# Patient Record
(demographics unavailable — no encounter records)

---

## 2024-12-30 NOTE — ASSESSMENT
[FreeTextEntry1] : (1) HCM - discussed diet, exercise, weight maintenance. Labs drawn in office as below.  HIV testing offered and patient declined.  Patient declines further COVID-19 boosters.  She received the influenza vaccination this season.  Tdap given today.  Patient is a frequent traveler and would need a booster of typhoid (last given 2019) if traveling to a region for which it is recommended. Continue Gyn exams as directed by Dr. Diallo.  Patient was previously given Health Care Proxy information.  (2) GI - follow-up with GI for IBS symptoms.  (3) Derm - patient uses fluocinonide for hand eczema.  She was also given tacrolimus.  She sees Allergy and Dermatology.  Patient also has a family history of melanoma and should have annual full body skin exams by Dermatology.  (4) Heme - patient reports a history of iron deficiency, and iron studies were added to labs today.  She has also had low B12 levels and would recommend vitamin B12 1000mcg daily.  (5) Ortho - patient self-treating wrist pains with splints.  I recommended that she have an evaluation by an Ortho-hand physician.

## 2024-12-30 NOTE — HEALTH RISK ASSESSMENT
[Yes] : Yes [2 - 3 times a week (3 pts)] : 2 - 3  times a week (3 points) [No] : In the past 12 months have you used drugs other than those required for medical reasons? No [No falls in past year] : Patient reported no falls in the past year [0] : 2) Feeling down, depressed, or hopeless: Not at all (0) [PHQ-2 Negative - No further assessment needed] : PHQ-2 Negative - No further assessment needed [Patient reported PAP Smear was normal] : Patient reported PAP Smear was normal [Patient reported colonoscopy was normal] : Patient reported colonoscopy was normal [HIV test declined] : HIV test declined [None] : None [With Family] : lives with family [Employed] : employed [Single] : single [Fully functional (bathing, dressing, toileting, transferring, walking, feeding)] : Fully functional (bathing, dressing, toileting, transferring, walking, feeding) [Fully functional (using the telephone, shopping, preparing meals, housekeeping, doing laundry, using] : Fully functional and needs no help or supervision to perform IADLs (using the telephone, shopping, preparing meals, housekeeping, doing laundry, using transportation, managing medications and managing finances) [Smoke Detector] : smoke detector [Carbon Monoxide Detector] : carbon monoxide detector [Seat Belt] :  uses seat belt [Sunscreen] : uses sunscreen [Designated Healthcare Proxy] : Designated healthcare proxy [Name: ___] : Health Care Proxy's Name: [unfilled]  [Relationship: ___] : Relationship: [unfilled] [Never] : Never [de-identified] : Runner, 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 [[id number]] : 0 [Change in mental status noted] : No change in mental status noted [Sexually Active] : not sexually active [Reports changes in hearing] : Reports no changes in hearing [Reports changes in vision] : Reports no changes in vision [Reports changes in dental health] : Reports no changes in dental health [PapSmearDate] : 2024 [PapSmearComments] : Dr. Valdez Diallo [ColonoscopyDate] : 03/21 [ColonoscopyComments] : Dr. Madi Sewell [FreeTextEntry2] :  History [de-identified] : No vision corrections.  Seen by ophthalmologist 10/31/2022 - allergic reaction involving eyes.  Had cyst in eye. [AdvancecareDate] : 12/30/2024 [FreeTextEntry4] : Written materials for preparing a Health Care Proxy were previously given to patient, and I encouraged the patient to complete a Health Care Proxy.

## 2024-12-30 NOTE — PHYSICAL EXAM
[No Acute Distress] : no acute distress [Well Nourished] : well nourished [Well Developed] : well developed [Well-Appearing] : well-appearing [Normal Voice/Communication] : normal voice/communication [Normal Sclera/Conjunctiva] : normal sclera/conjunctiva [PERRL] : pupils equal round and reactive to light [Normal Outer Ear/Nose] : the outer ears and nose were normal in appearance [Normal Oropharynx] : the oropharynx was normal [Normal TMs] : both tympanic membranes were normal [No JVD] : no jugular venous distention [No Lymphadenopathy] : no lymphadenopathy [Supple] : supple [Thyroid Normal, No Nodules] : the thyroid was normal and there were no nodules present [No Respiratory Distress] : no respiratory distress  [No Accessory Muscle Use] : no accessory muscle use [Clear to Auscultation] : lungs were clear to auscultation bilaterally [Normal Rate] : normal rate  [Regular Rhythm] : with a regular rhythm [Normal S1, S2] : normal S1 and S2 [No Murmur] : no murmur heard [No Carotid Bruits] : no carotid bruits [No Abdominal Bruit] : a ~M bruit was not heard ~T in the abdomen [No Varicosities] : no varicosities [Pedal Pulses Present] : the pedal pulses are present [No Edema] : there was no peripheral edema [No Extremity Clubbing/Cyanosis] : no extremity clubbing/cyanosis [Normal Appearance] : normal in appearance [No Nipple Discharge] : no nipple discharge [No Axillary Lymphadenopathy] : no axillary lymphadenopathy [Soft] : abdomen soft [Non Tender] : non-tender [Non-distended] : non-distended [No Masses] : no abdominal mass palpated [No HSM] : no HSM [Normal Bowel Sounds] : normal bowel sounds [No Hernias] : no hernias [No CVA Tenderness] : no CVA  tenderness [No Spinal Tenderness] : no spinal tenderness [No Joint Swelling] : no joint swelling [Grossly Normal Strength/Tone] : grossly normal strength/tone [Coordination Grossly Intact] : coordination grossly intact [No Focal Deficits] : no focal deficits [Normal Gait] : normal gait [Speech Grossly Normal] : speech grossly normal [Normal Affect] : the affect was normal [Alert and Oriented x3] : oriented to person, place, and time [Normal Mood] : the mood was normal [Normal Insight/Judgement] : insight and judgment were intact [de-identified] : b/l hand and upper arm eczema

## 2024-12-30 NOTE — HISTORY OF PRESENT ILLNESS
[de-identified] : Patient presents for a follow-up annual physical.  Patient is a 30-year-old female with a history of IBS, managed with diet, probiotic, IB guard, and followed by Dr. Sewell (GI).  Dr. Sewell has retired, and she will see Dr. Pennington.  She had colonoscopy and EGD completed with no significant findings.  She takes Kijimea supplements currently.  She has been seeing a podiatrist for recurrent foot issues such as cuboid syndrome and using orthotics.  Patient is a runner.  She has eczema of the hands and arms.  She has seen Dermatology and Allergy.  She has been on fluocinonide 0.05% ointment on her hands.  She sees Dr. Glenn Peterson (Allergy, [place]).  She also has a history of scoliosis.  She was seen by Hematology in 2014 for mild anemia, which was felt to be her baseline.  She also saw Neurology at that time for tension headaches.  She has vocal nodules/cysts which recurred in 2023.  She may be having surgery for this in the future but has deferred it for now.  She had voice therapy for this which did not help.  She had the COVID-19 illness 8/27/2022 while traveling.  Both of patient's wrists have "not been great".  Sometimes when she is turning a doorknob, there is a pain.  She got herself a brace.  Patient received the influenza vaccination approximately Nov 2023.  She declines further COVID-19 boosters.  She is not sure of her last Tdap but will check her records.